# Patient Record
Sex: MALE | Race: WHITE | Employment: STUDENT | URBAN - METROPOLITAN AREA
[De-identification: names, ages, dates, MRNs, and addresses within clinical notes are randomized per-mention and may not be internally consistent; named-entity substitution may affect disease eponyms.]

---

## 2017-04-16 ENCOUNTER — APPOINTMENT (OUTPATIENT)
Dept: CT IMAGING | Facility: CLINIC | Age: 22
End: 2017-04-16
Attending: EMERGENCY MEDICINE
Payer: COMMERCIAL

## 2017-04-16 ENCOUNTER — HOSPITAL ENCOUNTER (EMERGENCY)
Facility: CLINIC | Age: 22
Discharge: HOME | End: 2017-04-16
Attending: EMERGENCY MEDICINE | Admitting: EMERGENCY MEDICINE
Payer: COMMERCIAL

## 2017-04-16 VITALS
DIASTOLIC BLOOD PRESSURE: 76 MMHG | WEIGHT: 190 LBS | RESPIRATION RATE: 20 BRPM | HEART RATE: 82 BPM | SYSTOLIC BLOOD PRESSURE: 123 MMHG | BODY MASS INDEX: 25.76 KG/M2 | OXYGEN SATURATION: 95 % | TEMPERATURE: 98.7 F

## 2017-04-16 DIAGNOSIS — K50.00: ICD-10-CM

## 2017-04-16 LAB
ALBUMIN UR-MCNC: NEGATIVE MG/DL
ANION GAP SERPL CALCULATED.3IONS-SCNC: 6 MMOL/L (ref 3–14)
APPEARANCE UR: CLEAR
BACTERIA #/AREA URNS HPF: ABNORMAL /HPF
BASOPHILS # BLD AUTO: 0 10E9/L (ref 0–0.2)
BASOPHILS NFR BLD AUTO: 0.6 %
BILIRUB UR QL STRIP: NEGATIVE
BUN SERPL-MCNC: 10 MG/DL (ref 7–30)
CALCIUM SERPL-MCNC: 8.8 MG/DL (ref 8.5–10.1)
CHLORIDE SERPL-SCNC: 106 MMOL/L (ref 94–109)
CO2 SERPL-SCNC: 28 MMOL/L (ref 20–32)
COLOR UR AUTO: YELLOW
CREAT SERPL-MCNC: 0.82 MG/DL (ref 0.66–1.25)
DIFFERENTIAL METHOD BLD: ABNORMAL
EOSINOPHIL # BLD AUTO: 0.1 10E9/L (ref 0–0.7)
EOSINOPHIL NFR BLD AUTO: 2.1 %
ERYTHROCYTE [DISTWIDTH] IN BLOOD BY AUTOMATED COUNT: 12.2 % (ref 10–15)
GFR SERPL CREATININE-BSD FRML MDRD: NORMAL ML/MIN/1.7M2
GLUCOSE SERPL-MCNC: 93 MG/DL (ref 70–99)
GLUCOSE UR STRIP-MCNC: NEGATIVE MG/DL
HCT VFR BLD AUTO: 37.5 % (ref 40–53)
HGB BLD-MCNC: 13.1 G/DL (ref 13.3–17.7)
HGB UR QL STRIP: ABNORMAL
IMM GRANULOCYTES # BLD: 0 10E9/L (ref 0–0.4)
IMM GRANULOCYTES NFR BLD: 0.3 %
KETONES UR STRIP-MCNC: NEGATIVE MG/DL
LEUKOCYTE ESTERASE UR QL STRIP: NEGATIVE
LYMPHOCYTES # BLD AUTO: 1.7 10E9/L (ref 0.8–5.3)
LYMPHOCYTES NFR BLD AUTO: 26.1 %
MCH RBC QN AUTO: 31.2 PG (ref 26.5–33)
MCHC RBC AUTO-ENTMCNC: 34.9 G/DL (ref 31.5–36.5)
MCV RBC AUTO: 89 FL (ref 78–100)
MONOCYTES # BLD AUTO: 0.6 10E9/L (ref 0–1.3)
MONOCYTES NFR BLD AUTO: 9.6 %
MUCOUS THREADS #/AREA URNS LPF: PRESENT /LPF
NEUTROPHILS # BLD AUTO: 4 10E9/L (ref 1.6–8.3)
NEUTROPHILS NFR BLD AUTO: 61.3 %
NITRATE UR QL: NEGATIVE
NRBC # BLD AUTO: 0 10*3/UL
NRBC BLD AUTO-RTO: 0 /100
PH UR STRIP: 5 PH (ref 5–7)
PLATELET # BLD AUTO: 192 10E9/L (ref 150–450)
POTASSIUM SERPL-SCNC: 3.8 MMOL/L (ref 3.4–5.3)
RBC # BLD AUTO: 4.2 10E12/L (ref 4.4–5.9)
RBC #/AREA URNS AUTO: 4 /HPF (ref 0–2)
SODIUM SERPL-SCNC: 140 MMOL/L (ref 133–144)
SP GR UR STRIP: 1.02 (ref 1–1.03)
URN SPEC COLLECT METH UR: ABNORMAL
UROBILINOGEN UR STRIP-MCNC: 0 MG/DL (ref 0–2)
WBC # BLD AUTO: 6.5 10E9/L (ref 4–11)
WBC #/AREA URNS AUTO: 1 /HPF (ref 0–2)

## 2017-04-16 PROCEDURE — 74177 CT ABD & PELVIS W/CONTRAST: CPT

## 2017-04-16 PROCEDURE — 99285 EMERGENCY DEPT VISIT HI MDM: CPT | Mod: 25

## 2017-04-16 PROCEDURE — 85025 COMPLETE CBC W/AUTO DIFF WBC: CPT | Performed by: EMERGENCY MEDICINE

## 2017-04-16 PROCEDURE — 81001 URINALYSIS AUTO W/SCOPE: CPT | Performed by: EMERGENCY MEDICINE

## 2017-04-16 PROCEDURE — 25000125 ZZHC RX 250: Performed by: EMERGENCY MEDICINE

## 2017-04-16 PROCEDURE — 25500064 ZZH RX 255 OP 636: Performed by: EMERGENCY MEDICINE

## 2017-04-16 PROCEDURE — 80048 BASIC METABOLIC PNL TOTAL CA: CPT | Performed by: EMERGENCY MEDICINE

## 2017-04-16 PROCEDURE — 96374 THER/PROPH/DIAG INJ IV PUSH: CPT | Mod: 59

## 2017-04-16 PROCEDURE — 25000128 H RX IP 250 OP 636: Performed by: EMERGENCY MEDICINE

## 2017-04-16 RX ORDER — IOPAMIDOL 755 MG/ML
500 INJECTION, SOLUTION INTRAVASCULAR ONCE
Status: COMPLETED | OUTPATIENT
Start: 2017-04-16 | End: 2017-04-16

## 2017-04-16 RX ORDER — KETOROLAC TROMETHAMINE 30 MG/ML
30 INJECTION, SOLUTION INTRAMUSCULAR; INTRAVENOUS ONCE
Status: COMPLETED | OUTPATIENT
Start: 2017-04-16 | End: 2017-04-16

## 2017-04-16 RX ORDER — PREDNISONE 20 MG/1
40 TABLET ORAL ONCE
Status: COMPLETED | OUTPATIENT
Start: 2017-04-16 | End: 2017-04-16

## 2017-04-16 RX ORDER — PREDNISONE 20 MG/1
40 TABLET ORAL DAILY
Qty: 14 TABLET | Refills: 0 | Status: SHIPPED | OUTPATIENT
Start: 2017-04-16 | End: 2017-04-23

## 2017-04-16 RX ORDER — LIDOCAINE 40 MG/G
CREAM TOPICAL
Status: DISCONTINUED | OUTPATIENT
Start: 2017-04-16 | End: 2017-04-16 | Stop reason: HOSPADM

## 2017-04-16 RX ADMIN — SODIUM CHLORIDE 64 ML: 9 INJECTION, SOLUTION INTRAVENOUS at 11:07

## 2017-04-16 RX ADMIN — IOPAMIDOL 95 ML: 755 INJECTION, SOLUTION INTRAVENOUS at 11:03

## 2017-04-16 RX ADMIN — PREDNISONE 40 MG: 20 TABLET ORAL at 12:48

## 2017-04-16 RX ADMIN — KETOROLAC TROMETHAMINE 30 MG: 30 INJECTION, SOLUTION INTRAMUSCULAR at 12:18

## 2017-04-16 ASSESSMENT — ENCOUNTER SYMPTOMS
FLANK PAIN: 1
SHORTNESS OF BREATH: 0
BLOOD IN STOOL: 0
FEVER: 0
DYSURIA: 0
DIARRHEA: 0
NAUSEA: 0
CHILLS: 0
ABDOMINAL PAIN: 1
ABDOMINAL DISTENTION: 0
VOMITING: 0
HEMATURIA: 0

## 2017-04-16 NOTE — ED AVS SNAPSHOT
[facility]    [address]    [facility] [address]    Phone:  [phone]    Fax:  [phone]                                       Luis Munson   MRN: [id number]    Department:  [facility]   Date of Visit:  4/16/2017           After Visit Summary Signature Page     I have received my discharge instructions, and my questions have been answered. I have discussed any challenges I see with this plan with the nurse or doctor.    ..........................................................................................................................................  Patient/Patient Representative Signature      ..........................................................................................................................................  Patient Representative Print Name and Relationship to Patient    ..................................................               ................................................  Date                                            Time    ..........................................................................................................................................  Reviewed by Signature/Title    ...................................................              ..............................................  Date                                                            Time

## 2017-04-16 NOTE — ED NOTES
"Pain is the \"the same\", asked for ibuprofen for pain with movement and cough, prefers non-narcotic.  He is NPO for now. Dr Keene notified, wants to wait for CT results if possible.  "

## 2017-04-16 NOTE — ED PROVIDER NOTES
History     Chief Complaint:  Abdominal Pain      HPI   Luis Munson is a 21 year old male well controlled a history of Crohn's disease on Humira who presents with abdominal pain.  The patient reports developing focal pain in his left lateral abdomen and left flank about 1900 last night.  He has increased sharp pain in that area with deep breathing, laughing or certain movements.  He feels his pain is likely related to his Crohn's based on the location however it is different in that he has no associated nausea.  He has not had a bowel movement since he developed the pain however he denies any recent changes to his bowel habits or blood in his stool.  He denies any urinary symptoms, fevers, chills, or bloating.  He was admitted with a partial small bowel obstruction 3 years ago which he recalls being caused by a stricture from past scarring causing a stricture point.  He does not think his Crohn's was actually flaring at that time and the obstruction resolved without intervention.  He has never had any abdominal surgeries.  His disease has been fairly well controlled with Humira and a low residual diet with no recent flares.  He had a serious infection in his maxillary bone after dental work earlier this year so he was off Humira in February and March.  He is back on Humira, last dose 4 days ago.  He follows with Dr. Rodrick Rivera with [facility] for his Crohn's and spoke with the on call physician who recommended he come to the ED for evaluation.    Allergies:  No known drug allergies.      Medications:    Humira   Sertraline      Past Medical History:    Crohn's disease  Small bowel obstruction   Substance abuse  Depression     Past Surgical History:    History reviewed. No pertinent past surgical history.     Family History:    History reviewed. No pertinent family history.     Social History:  Marital Status: single  Presents to the ED with his girlfriend  Tobacco Use: Former  smoker  Alcohol Use: Former abuse, sober for 4 years  Drug use: History of cocaine, marijuana, benzodiazepine, and opioid abuse; sober for 4 years  Gastroenterologist: Rodrick Rivera     Review of Systems   Constitutional: Negative for chills and fever.   Respiratory: Negative for shortness of breath.    Cardiovascular: Negative for chest pain.   Gastrointestinal: Positive for abdominal pain. Negative for abdominal distention, blood in stool, diarrhea, nausea and vomiting.   Genitourinary: Positive for flank pain. Negative for dysuria and hematuria.   All other systems reviewed and are negative.    Physical Exam   First Vitals:  BP: 121/83  Pulse: 82  Temp: 98.7  F (37.1  C)  Resp: 20  Weight: 86.2 kg (190 lb)  SpO2: 100 %      Physical Exam  General: Patient is alert and cooperative.  HENT: No nasal congestion or drainage.   Eyes: EOMI. Normal conjunctiva.  Neck: Normal range of motion. Neck supple.  Cardiovascular: Normal rate, regular rhythm and normal heart sounds.   Pulmonary/Chest: Effort normal.   Abdominal: Soft. Patient exhibits no distension and no mass. There is left sided tenderness.      Musculoskeletal: Normal range of motion.   Neurological: Patient  is alert and oriented.   Skin: Skin is warm and dry. No rash noted.   Psychiatric: Patient has a normal mood and affect. Normal behavior and judgement.      Emergency Department Course     Imaging:  Abdomen/Pelvis CT with IV contrast:  1. Diffuse mild small bowel wall thickening and enhancement along with some fold prominence. Mild distention of the mid to distal small bowel with fluid with some mild dilatation at the distal ileum. These findings may represent an infectious or inflammatory enteritis. Cannot exclude the possibility of active inflammatory bowel disease. There is no fistula or abscess seen. No convincing bowel obstruction at this time.  2. Borderline fluid dilatation of the appendix at the right lower quadrant measuring 0.8 cm. No  adjacent inflammation is convincingly demonstrated. However, please correlate clinically for any evidence of appendicitis.  3. New finding of focal hypoenhancement at the mid to low right kidney that is indeterminate. This imaging finding may be seen with pyelonephritis and correlation with urinalysis is recommended. An underlying neoplastic etiology is ultimately not excluded. Recommend a follow-up CT within 3 months to assess for resolution.  4. Trace pelvic fluid.  Report per radiology.      Radiographic findings were communicated with the patient who voiced understanding of the findings.    Laboratory:  CBC: RBC 4.2 (L), HGB 13.1 (L), HCT 37.5 (L), otherwise WNL (WBC 6.5, )   BMP: WNL (Creatinine 0.82)     UA: Clear yellow urine, Blood moderate, RBC/HPF 4 (H), Bacteria few, Mucous present, otherwise WNL     Interventions:  (1218) Toradol, 30 mg, IV injection    (1248) Prednisone, 40 mg, PO    Emergency Department Course:  Nursing notes and vitals reviewed.  I performed an exam of the patient as documented above.     Urine sample was obtained and sent for laboratory analysis, findings above.    A peripheral IV was established. Blood was drawn from the patient. This was sent for laboratory testing, findings above.       (6002) I spoke with Dr. Carr with [facility] regarding the patient.      The patient was sent for a CT scan of the abdomen and pelvis while in the emergency department, findings above.      (1210) Recheck: patient updated on results    (1230) I spoke with Dr. Carr regarding the results of the patient's CT scan.  He recommends starting Prednisone and having the patient follow up in clinic this week.    Findings and plan explained to the patient. Patient discharged home with instructions regarding supportive care, medications, and reasons to return. The importance of close follow-up was reviewed. The patient was prescribed Prednisone.  I personally reviewed the laboratory  results with the patient and answered all related questions prior to discharge.       Impression & Plan      Medical Decision Making:  Luis Munson is a 21 year old male with acute left sided abdominal discomfort.  History is notable for Crohn's disease diagnosed in preadolescence.  He is maintained on Humira and has not had a significant Crohn's related complication, including no prior abdominal surgeries.  He has had no fever or change in bowel habits, including no bloody stool.  Exam shows a well appearing 21 year old with mild but no significant reproducible abdominal tenderness.  His evaluation has included a normal white blood cell count, electrolytes, and UA depicting trace microscopic hematuria.  I did touch base early on with [facility].  Given his clinical presentation, they did advise imaging.  A CT abdomen and pelvis was undertaken which demonstrates diffuse small bowel wall thickening with enhancement involving the mid to distal small bowel.  There is no fistula, abscess, or obstruction seen.  There is additionally a finding of mild hypo-enhancement of the left mid to low kidney, indeterminate.  He has no signs or symptoms to suggest pyelonephritis.  Radiology has recommended follow up CT within 3 months to reassess and did note that an underlying neoplastic etiology was not ultimately excluded.  These findings were discussed with Luis and GI.  He was medicated here with IV Toradol and 40 mg of Prednisone.  He will be discharged with prescription for Prednisone to take as directed until he is able to follow up with his Gastroenterologist later this week.  Patient's father is a physician in [place] and I did speak with him via telephone, updated him on CT findings and plan.  Clinically this likely does represent uncomplicated Crohn's exacerbation.    Diagnosis:    ICD-10-CM    1. Exacerbation of Crohn's disease of small intestine, without complications (H) K50.00    2.  Focal  hypo-enhancement of right kidney of undetermined etiology, unable to exclude neoplasm    Disposition:  Discharged to home.     Discharge Medications:  New Prescriptions    PREDNISONE (DELTASONE) 20 MG TABLET    Take 2 tablets (40 mg) by mouth daily for 7 days       I, Carol Yarbrough, am serving as a scribe on 4/16/2017 at 8:27 AM to personally document services performed by Dr. Keene based on my observations and the provider's statements to me.    Carol Yarbrough  4/16/2017   [facility] EMERGENCY DEPARTMENT       Rajan Keene MD  04/17/17 0939

## 2017-04-16 NOTE — ED NOTES
Patient with a history of Chron's disease presents with left lower abdomen pain. Denies N/V/D. He is alert and oriented, ABCs intact.

## 2017-04-16 NOTE — ED AVS SNAPSHOT
[facility] Emergency Department    [address]    [facility] [address]    Phone:  [phone]    Fax:  [phone]                                       Luis Munson   MRN: [id number]    Department:  [facility] Emergency Department   Date of Visit:  4/16/2017           Patient Information     Date Of Birth          1995        Your diagnoses for this visit were:     Exacerbation of Crohn's disease of small intestine, without complications (H)        You were seen by Rajan Keene MD.      Follow-up Information     Follow up with GI.    Why:  for re-evaluation of your symptoms this week        Discharge Instructions         Management of Crohn s Disease: Medications  Your doctor may prescribe medication to control your Crohn s disease symptoms and improve your quality of life. Medication won t cure Crohn s disease, but it can help keep the disease from slowing you down. As always, work closely with your doctor. Your medication or dosage may need to be changed if you have certain side effects or if your symptoms change  Corticosteroids  Your doctor may advise you to take corticosteroids. These help to calm inflammation in your body. This can make your symptoms better quickly. You may take corticosteroids as a pill, or liquid by mouth. In some cases, they may be given through an IV or given rectally as either a suppository or an enema. You take them for a short time, usually not longer than 8 to 12 weeks. You do not take them when you are in remission. Remission is a long period with no symptoms.  If used for a long time, side effects may include:    Mood changes    Trouble sleeping    Changes in body shape    Puffy face or acne    Weight gain    Stretch marks    Eye problems       Bone loss or breaks    Facial hair in women    High blood pressure    Risk of diabetes  Immunomodulators  These kinds of medications cause your body s immune system to be less active.  This can help to reduce inflammation and calm your symptoms. They are taken as a pill, by mouth. You may not feel their effects until you have taken them for a few months. But you can take them for a long time. You will need to have blood tests every few months to check your liver and blood cell counts.  Side effects may include:    Nausea    Body aches    Inflammation of the pancreas    Low white blood cell count    Liver problems    Low folic acid levels    Infection    Lymphoma    Non-melanoma skin cancer  Biologic agents  These kinds of medications help to stop a chemical that your body makes, which causes inflammation. The chemical is called tumor necrosis factor (TNF). The medications are also known as anti-TNF monoclonal antibodies. The way you take a biologic agent depends on how the drug is given. It may be given through an IV every 2 to 8 weeks. It may be given through an injection (shot) as often as once a week. Or it may be given as a shot once a month. These drugs can put you at risk for infections, so tell your doctor if you have a chronic infection. Your doctor may also test you for tuberculosis and hepatitis B infection before giving you the medication.  Side effects may include:    Flushing, chest pain, shortness of breath, hives, or a drop in blood pressure during IV treatment    Joint and muscle aches    Rash    Fever    Infection (including reactivation of the tuberculosis and hepatitis B)    Lymphoma    Skin cancers    Hepatotoxity    TNF-induced psoriasis  Antibiotics  These may be used if you also have an infection due to Crohn s disease, such as an abscess. Antibiotics may be given as a pill taken by mouth. You should stay out of the sun while taking them. You should also not drink alcohol while taking them. It may cause severe reactions, such as nausea, vomiting, and breathing problems. Also, tell your doctor right away if you have numbness or tingling in your hands. Also tell your doctor if  your bowel symptoms become worse.  Side effects may include:    Nausea    Diarrhea    Headaches    Dizziness    Dark urine    Loss of appetite    Metallic taste in the mouth    Sensitivity to the sun  Handling side effects  You and your doctor will discuss side effects. In most cases, side effects are easy to manage. But sometimes they can become severe enough that you need to change medication. Call your doctor if you re having side effects that trouble you. Also call if you re having any side effects that are unexpected.    9601-5146 [employer]. [address] [place] [address], [place] [address]. All rights reserved. This information is not intended as a substitute for professional medical care. Always follow your healthcare professional's instructions.          24 Hour Appointment Hotline       To make an appointment at any [facility], call [phone] ([phone]). If you don't have a family doctor or clinic, we will help you find one. [facility] clinics are conveniently located to serve the needs of you and your family.             Review of your medicines      START taking        Dose / Directions Last dose taken    predniSONE 20 MG tablet   Commonly known as:  DELTASONE   Dose:  40 mg   Quantity:  14 tablet        Take 2 tablets (40 mg) by mouth daily for 7 days   Refills:  0          Our records show that you are taking the medicines listed below. If these are incorrect, please call your family doctor or clinic.        Dose / Directions Last dose taken    acetaminophen 325 MG tablet   Commonly known as:  TYLENOL   Dose:  650 mg   Quantity:  100 tablet        Take 2 tablets (650 mg) by mouth every 4 hours as needed for mild pain   Refills:  0        calcium carbonate 500 MG chewable tablet   Commonly known as:  TUMS   Dose:  2 chew tab        Take 2 chew tab by mouth daily as needed   Refills:  0        granisetron 1 MG tablet   Commonly known as:  KYTRIL   Dose:  1 mg        Take 1 mg by  mouth every 12 hours as needed for nausea   Refills:  0        HUMIRA 40 MG/0.8ML injection   Dose:  40 mg   Generic drug:  adalimumab        Inject 40 mg Subcutaneous every 14 days   Refills:  0        LIALDA 1.2 G EC tablet   Dose:  2400 mg   Generic drug:  mesalamine        Take 2,400 mg by mouth daily   Refills:  0        magnesium hydroxide 400 MG/5ML suspension   Commonly known as:  MILK OF MAGNESIA   Dose:  30 mL        Take 30 mLs by mouth daily as needed for constipation or heartburn   Refills:  0        sertraline 100 MG tablet   Commonly known as:  ZOLOFT   Dose:  150 mg        Take 150 mg by mouth daily   Refills:  0                Prescriptions were sent or printed at these locations (1 Prescription)                   Other Prescriptions                Printed at Department/Unit printer (1 of 1)         predniSONE (DELTASONE) 20 MG tablet                Procedures and tests performed during your visit     Basic metabolic panel    CBC with platelets differential    CT Abdomen Pelvis w Contrast    Peripheral IV: Standard    UA with Microscopic      Orders Needing Specimen Collection     None      Pending Results     Date and Time Order Name Status Description    4/16/2017 0942 CT Abdomen Pelvis w Contrast Preliminary             Pending Culture Results     No orders found from 4/14/2017 to 4/17/2017.            Test Results From Your Hospital Stay        4/16/2017  9:14 AM      Component Results     Component Value Ref Range & Units Status    WBC 6.5 4.0 - 11.0 10e9/L Final    RBC Count 4.20 (L) 4.4 - 5.9 10e12/L Final    Hemoglobin 13.1 (L) 13.3 - 17.7 g/dL Final    Hematocrit 37.5 (L) 40.0 - 53.0 % Final    MCV 89 78 - 100 fl Final    MCH 31.2 26.5 - 33.0 pg Final    MCHC 34.9 31.5 - 36.5 g/dL Final    RDW 12.2 10.0 - 15.0 % Final    Platelet Count 192 150 - 450 10e9/L Final    Diff Method Automated Method  Final    % Neutrophils 61.3 % Final    % Lymphocytes 26.1 % Final    % Monocytes 9.6 % Final    %  Eosinophils 2.1 % Final    % Basophils 0.6 % Final    % Immature Granulocytes 0.3 % Final    Nucleated RBCs 0 0 /100 Final    Absolute Neutrophil 4.0 1.6 - 8.3 10e9/L Final    Absolute Lymphocytes 1.7 0.8 - 5.3 10e9/L Final    Absolute Monocytes 0.6 0.0 - 1.3 10e9/L Final    Absolute Eosinophils 0.1 0.0 - 0.7 10e9/L Final    Absolute Basophils 0.0 0.0 - 0.2 10e9/L Final    Abs Immature Granulocytes 0.0 0 - 0.4 10e9/L Final    Absolute Nucleated RBC 0.0  Final         4/16/2017  9:27 AM      Component Results     Component Value Ref Range & Units Status    Sodium 140 133 - 144 mmol/L Final    Potassium 3.8 3.4 - 5.3 mmol/L Final    Chloride 106 94 - 109 mmol/L Final    Carbon Dioxide 28 20 - 32 mmol/L Final    Anion Gap 6 3 - 14 mmol/L Final    Glucose 93 70 - 99 mg/dL Final    Urea Nitrogen 10 7 - 30 mg/dL Final    Creatinine 0.82 0.66 - 1.25 mg/dL Final    GFR Estimate >90  Non  GFR Calc   >60 mL/min/1.7m2 Final    GFR Estimate If Black >90   GFR Calc   >60 mL/min/1.7m2 Final    Calcium 8.8 8.5 - 10.1 mg/dL Final         4/16/2017  9:15 AM      Component Results     Component Value Ref Range & Units Status    Color Urine Yellow  Final    Appearance Urine Clear  Final    Glucose Urine Negative NEG mg/dL Final    Bilirubin Urine Negative NEG Final    Ketones Urine Negative NEG mg/dL Final    Specific Gravity Urine 1.017 1.003 - 1.035 Final    Blood Urine Moderate (A) NEG Final    pH Urine 5.0 5.0 - 7.0 pH Final    Protein Albumin Urine Negative NEG mg/dL Final    Urobilinogen mg/dL 0.0 0.0 - 2.0 mg/dL Final    Nitrite Urine Negative NEG Final    Leukocyte Esterase Urine Negative NEG Final    Source Midstream Urine  Final    WBC Urine 1 0 - 2 /HPF Final    RBC Urine 4 (H) 0 - 2 /HPF Final    Bacteria Urine Few (A) NEG /HPF Final    Mucous Urine Present (A) NEG /LPF Final         4/16/2017 11:46 AM      Narrative     CT ABDOMEN AND PELVIS WITH CONTRAST   4/16/2017 11:28 AM     HISTORY:  Left-sided pain; history Crohn's disease.    TECHNIQUE: CT abdomen and pelvis with 95mL Isovue-370 IV. Radiation  dose for this scan was reduced using automated exposure control,  adjustment of the mA and/or kV according to patient size, or iterative  reconstruction technique.    COMPARISON: MRI abdomen 5/15/2015, CT abdomen and pelvis 4/20/2014. No  evidence convincing for bowel obstruction. There are multiple small  bowel loops at the mid to distal abdomen that are minimally distended  with fluid, but these loops also show some mild diffuse wall  enhancement and mild fold prominence. The most distal portion of the  ileum near the ileocecal junction is mildly dilated series 2 image 71.  Stool is seen diffusely throughout the colon. No acute inflammatory  change of the colon. There is mild fluid dilatation and wall  enhancement of the appendix measuring 0.8 cm series 2 image 68 at the  right lower quadrant. Small volume free pelvic fluid. No abscess or  free air. No evidence to suggest fistula at this time. Liver,  gallbladder, adrenals, spleen, and pancreas appear unremarkable for  any acute abnormality. There is an indeterminant 1.5 cm focal area of  hypoenhancement along the lateral right mid to low kidney that is  newly seen series 2 image 33. There is no hydronephrosis, or evidence  for urolithiasis. The left kidney is unremarkable.        Impression     IMPRESSION:  1. Diffuse mild small bowel wall thickening and enhancement along with  some fold prominence. Mild distention of the mid to distal small bowel  with fluid with some mild dilatation at the distal ileum. These  findings may represent an infectious or inflammatory enteritis. Cannot  exclude the possibility of active inflammatory bowel disease. There is  no fistula or abscess seen. No convincing bowel obstruction at this  time.  2. Borderline fluid dilatation of the appendix at the right lower  quadrant measuring 0.8 cm. No adjacent inflammation is  convincingly  demonstrated. However, please correlate clinically for any evidence of  appendicitis.  3. New finding of focal hypoenhancement at the mid to low right kidney  that is indeterminant. This imaging finding may be seen with  pyelonephritis and correlation with urinalysis is recommended. An  underlying neoplastic etiology is ultimately not excluded. Recommend a  follow-up CT within 3 months to assess for resolution.  4. Trace pelvic fluid.                Clinical Quality Measure: Blood Pressure Screening     Your blood pressure was checked while you were in the emergency department today. The last reading we obtained was  BP: 123/76 . Please read the guidelines below about what these numbers mean and what you should do about them.  If your systolic blood pressure (the top number) is less than 120 and your diastolic blood pressure (the bottom number) is less than 80, then your blood pressure is normal. There is nothing more that you need to do about it.  If your systolic blood pressure (the top number) is 120-139 or your diastolic blood pressure (the bottom number) is 80-89, your blood pressure may be higher than it should be. You should have your blood pressure rechecked within a year by a primary care provider.  If your systolic blood pressure (the top number) is 140 or greater or your diastolic blood pressure (the bottom number) is 90 or greater, you may have high blood pressure. High blood pressure is treatable, but if left untreated over time it can put you at risk for heart attack, stroke, or kidney failure. You should have your blood pressure rechecked by a primary care provider within the next 4 weeks.  If your provider in the emergency department today gave you specific instructions to follow-up with your doctor or provider even sooner than that, you should follow that instruction and not wait for up to 4 weeks for your follow-up visit.        Thank you for choosing [facility]       Thank you for  "choosing [facility] for your care. Our goal is always to provide you with excellent care. Hearing back from our patients is one way we can continue to improve our services. Please take a few minutes to complete the written survey that you may receive in the mail after you visit with us. Thank you!        [email or web address] Information     [email or web address] lets you send messages to your doctor, view your test results, renew your prescriptions, schedule appointments and more. To sign up, go to [email or web address] . Click on \"Log in\" on the left side of the screen, which will take you to the Welcome page. Then click on \"Sign up Now\" on the right side of the page.     You will be asked to enter the access code listed below, as well as some personal information. Please follow the directions to create your username and password.     Your access code is: [email or web address]  Expires: 7/15/2017 12:53 PM     Your access code will  in 90 days. If you need help or a new code, please call your [facility] clinic or [phone].        Care EveryWhere ID     This is your Care EveryWhere ID. This could be used by other organizations to access your [facility] medical records  [id number]        After Visit Summary       This is your record. Keep this with you and show to your community pharmacist(s) and doctor(s) at your next visit.                  "

## 2017-04-16 NOTE — DISCHARGE INSTRUCTIONS
Management of Crohn s Disease: Medications  Your doctor may prescribe medication to control your Crohn s disease symptoms and improve your quality of life. Medication won t cure Crohn s disease, but it can help keep the disease from slowing you down. As always, work closely with your doctor. Your medication or dosage may need to be changed if you have certain side effects or if your symptoms change  Corticosteroids  Your doctor may advise you to take corticosteroids. These help to calm inflammation in your body. This can make your symptoms better quickly. You may take corticosteroids as a pill, or liquid by mouth. In some cases, they may be given through an IV or given rectally as either a suppository or an enema. You take them for a short time, usually not longer than 8 to 12 weeks. You do not take them when you are in remission. Remission is a long period with no symptoms.  If used for a long time, side effects may include:    Mood changes    Trouble sleeping    Changes in body shape    Puffy face or acne    Weight gain    Stretch marks    Eye problems       Bone loss or breaks    Facial hair in women    High blood pressure    Risk of diabetes  Immunomodulators  These kinds of medications cause your body s immune system to be less active. This can help to reduce inflammation and calm your symptoms. They are taken as a pill, by mouth. You may not feel their effects until you have taken them for a few months. But you can take them for a long time. You will need to have blood tests every few months to check your liver and blood cell counts.  Side effects may include:    Nausea    Body aches    Inflammation of the pancreas    Low white blood cell count    Liver problems    Low folic acid levels    Infection    Lymphoma    Non-melanoma skin cancer  Biologic agents  These kinds of medications help to stop a chemical that your body makes, which causes inflammation. The chemical is called tumor necrosis factor (TNF).  The medications are also known as anti-TNF monoclonal antibodies. The way you take a biologic agent depends on how the drug is given. It may be given through an IV every 2 to 8 weeks. It may be given through an injection (shot) as often as once a week. Or it may be given as a shot once a month. These drugs can put you at risk for infections, so tell your doctor if you have a chronic infection. Your doctor may also test you for tuberculosis and hepatitis B infection before giving you the medication.  Side effects may include:    Flushing, chest pain, shortness of breath, hives, or a drop in blood pressure during IV treatment    Joint and muscle aches    Rash    Fever    Infection (including reactivation of the tuberculosis and hepatitis B)    Lymphoma    Skin cancers    Hepatotoxity    TNF-induced psoriasis  Antibiotics  These may be used if you also have an infection due to Crohn s disease, such as an abscess. Antibiotics may be given as a pill taken by mouth. You should stay out of the sun while taking them. You should also not drink alcohol while taking them. It may cause severe reactions, such as nausea, vomiting, and breathing problems. Also, tell your doctor right away if you have numbness or tingling in your hands. Also tell your doctor if your bowel symptoms become worse.  Side effects may include:    Nausea    Diarrhea    Headaches    Dizziness    Dark urine    Loss of appetite    Metallic taste in the mouth    Sensitivity to the sun  Handling side effects  You and your doctor will discuss side effects. In most cases, side effects are easy to manage. But sometimes they can become severe enough that you need to change medication. Call your doctor if you re having side effects that trouble you. Also call if you re having any side effects that are unexpected.    [id number] [employer]. [address] [place] [address], [place] [address]. All rights reserved. This information is not intended as a substitute  for professional medical care. Always follow your healthcare professional's instructions.

## 2017-05-18 ENCOUNTER — HOSPITAL ENCOUNTER (OUTPATIENT)
Dept: MRI IMAGING | Facility: CLINIC | Age: 22
Discharge: HOME | End: 2017-05-18
Attending: INTERNAL MEDICINE | Admitting: INTERNAL MEDICINE
Payer: COMMERCIAL

## 2017-05-18 DIAGNOSIS — K50.00 CROHN'S DISEASE OF SMALL INTESTINE WITHOUT COMPLICATION (H): ICD-10-CM

## 2017-05-18 DIAGNOSIS — R10.12 LUQ PAIN: ICD-10-CM

## 2017-05-18 PROCEDURE — 25000132 ZZH RX MED GY IP 250 OP 250 PS 637: Performed by: RADIOLOGY

## 2017-05-18 PROCEDURE — 72197 MRI PELVIS W/O & W/DYE: CPT

## 2017-05-18 PROCEDURE — 25000128 H RX IP 250 OP 636: Performed by: RADIOLOGY

## 2017-05-18 PROCEDURE — A9585 GADOBUTROL INJECTION: HCPCS | Performed by: RADIOLOGY

## 2017-05-18 RX ORDER — GADOBUTROL 604.72 MG/ML
10 INJECTION INTRAVENOUS ONCE
Status: COMPLETED | OUTPATIENT
Start: 2017-05-18 | End: 2017-05-18

## 2017-05-18 RX ADMIN — GADOBUTROL 10 ML: 604.72 INJECTION INTRAVENOUS at 09:56

## 2017-05-18 RX ADMIN — HYOSCYAMINE SULFATE 125 MCG: 0.12 TABLET SUBLINGUAL at 09:00
